# Patient Record
Sex: MALE | Race: WHITE | NOT HISPANIC OR LATINO | Employment: FULL TIME | URBAN - METROPOLITAN AREA
[De-identification: names, ages, dates, MRNs, and addresses within clinical notes are randomized per-mention and may not be internally consistent; named-entity substitution may affect disease eponyms.]

---

## 2018-09-10 ENCOUNTER — HOSPITAL ENCOUNTER (EMERGENCY)
Facility: MEDICAL CENTER | Age: 53
End: 2018-09-10
Attending: EMERGENCY MEDICINE | Admitting: PLASTIC SURGERY

## 2018-09-10 ENCOUNTER — APPOINTMENT (OUTPATIENT)
Dept: RADIOLOGY | Facility: MEDICAL CENTER | Age: 53
End: 2018-09-10
Attending: PLASTIC SURGERY

## 2018-09-10 ENCOUNTER — HOSPITAL ENCOUNTER (OUTPATIENT)
Dept: RADIOLOGY | Facility: MEDICAL CENTER | Age: 53
End: 2018-09-10

## 2018-09-10 VITALS
RESPIRATION RATE: 18 BRPM | HEIGHT: 65 IN | BODY MASS INDEX: 39.41 KG/M2 | WEIGHT: 236.55 LBS | DIASTOLIC BLOOD PRESSURE: 91 MMHG | TEMPERATURE: 98 F | OXYGEN SATURATION: 97 % | SYSTOLIC BLOOD PRESSURE: 142 MMHG | HEART RATE: 75 BPM

## 2018-09-10 DIAGNOSIS — S61.213A LACERATION OF LEFT MIDDLE FINGER WITHOUT FOREIGN BODY, NAIL DAMAGE STATUS UNSPECIFIED, INITIAL ENCOUNTER: ICD-10-CM

## 2018-09-10 DIAGNOSIS — S62.609B OPEN FRACTURE OF PHALANX OF DIGIT OF HAND, INITIAL ENCOUNTER: ICD-10-CM

## 2018-09-10 PROCEDURE — 303747 HCHG EXTRA SUTURE

## 2018-09-10 PROCEDURE — 700111 HCHG RX REV CODE 636 W/ 250 OVERRIDE (IP): Performed by: EMERGENCY MEDICINE

## 2018-09-10 PROCEDURE — 99284 EMERGENCY DEPT VISIT MOD MDM: CPT

## 2018-09-10 PROCEDURE — 304999 HCHG REPAIR-SIMPLE/INTERMED LEVEL 1

## 2018-09-10 RX ORDER — BUPIVACAINE HYDROCHLORIDE 2.5 MG/ML
INJECTION, SOLUTION EPIDURAL; INFILTRATION; INTRACAUDAL
Status: DISCONTINUED
Start: 2018-09-10 | End: 2018-09-11 | Stop reason: HOSPADM

## 2018-09-10 RX ORDER — CEPHALEXIN 500 MG/1
500 CAPSULE ORAL 4 TIMES DAILY
Qty: 28 CAP | Refills: 0 | Status: SHIPPED | OUTPATIENT
Start: 2018-09-10 | End: 2018-09-17

## 2018-09-10 RX ORDER — BUPIVACAINE HYDROCHLORIDE 2.5 MG/ML
10 INJECTION, SOLUTION EPIDURAL; INFILTRATION; INTRACAUDAL ONCE
Status: COMPLETED | OUTPATIENT
Start: 2018-09-10 | End: 2018-09-10

## 2018-09-10 RX ORDER — HYDROCODONE BITARTRATE AND ACETAMINOPHEN 5; 325 MG/1; MG/1
1-2 TABLET ORAL EVERY 6 HOURS PRN
Qty: 20 TAB | Refills: 0 | Status: SHIPPED | OUTPATIENT
Start: 2018-09-10 | End: 2018-09-15

## 2018-09-10 RX ADMIN — BUPIVACAINE HYDROCHLORIDE 10 ML: 2.5 INJECTION, SOLUTION EPIDURAL; INFILTRATION; INTRACAUDAL; PERINEURAL at 19:30

## 2018-09-10 ASSESSMENT — PAIN SCALES - GENERAL: PAINLEVEL_OUTOF10: 5

## 2018-09-11 NOTE — ED NOTES
Per report, pt was using a branch shredder wearing heavy leather gloves and L middle finger was sucked in to shredder around 1200 today

## 2018-09-11 NOTE — OP REPORT
DATE OF SERVICE:  09/10/2018    PREOPERATIVE DIAGNOSIS:  Crush injury involving the distal phalanx and soft   tissue of the left middle finger.    POSTOPERATIVE DIAGNOSIS:  Crush injury involving the distal phalanx and soft   tissue of the left middle finger.    PROCEDURE:  1.  Washout of open fracture of the left middle finger with debridement   including skin, subcutaneous tissue, muscle, and bone.  2.  Open reduction of the comminuted distal phalangeal fracture and internal   fixation with suture.  3.  Repair of the distal finger soft tissue injury with local tissue   rearrangement, 4 cm2.  4.  Repair of nailbed injury.    ATTENDING SURGEON:  Alvin Choi MD    ANESTHESIA:  Digital block.    ESTIMATED BLOOD LOSS:  Minimal.    COMPLICATIONS:  No apparent.    INDICATIONS FOR PROCEDURE:  The patient is a 53-year-old gentleman who   sustained a significant injury to his left middle finger after getting the tip   caught in a wood .  The patient has a significantly comminuted open   fracture involving the distal phalanx.  I do not think there is enough bony   stability in order to place a percutaneous pin through.  He also did have   decreased flexion at the distal interphalangeal joint.  This is likely related   to the insertion of the FDP in the comminuted bone.  The patient now presents   for repair.    DESCRIPTION OF PROCEDURE:  After the informed consent was obtained, the finger   was marked.  Digital blocks were performed.  The finger was then copiously   irrigated.  Following this, a tourniquet was then placed at the base of the   wound.  The wound was then debrided which was done sharply with rongeurs and   Iris scissors and the debridement included skin, subcutaneous tissue, muscle   fascia, and bone.  Once this was then debrided, the finger was inspected.  The   bone was so significantly comminuted that I did not feel that any   percutaneous pin could be utilized.  A 4-0 Vicryl suture was then  used to   repair the soft tissue around the bone internally in order to reapproximate   this.  After this, the bone was then reapproximated with the suture.  There   was significant nailbed injury.  This was repaired with interrupted chromic   sutures and there was soft tissue injury and this was then repaired with local   advancement flap after it was undermined and advanced.  The 3-0 and 4-0 nylon   sutures were then used to repair this.  After doing so, the patient did have   some flexion at the joint.  Xeroform gauze and a compressive dressing were   then placed.  The patient tolerated the procedure well.  At the end of   procedure, all sponge, instrument, and needle counts were correct.       ____________________________________     MD LUPE RG / LUDY    DD:  09/10/2018 22:58:53  DT:  09/11/2018 02:35:42    D#:  [id number]  Job#:  [id number]

## 2018-09-11 NOTE — CONSULTS
DATE OF SERVICE:  09/10/2018    CONSULTING PHYSICIAN:  Cuauhtemoc Brizuela MD    REASON FOR CONSULTATION:  Left middle finger soft tissue injury and fracture.    HISTORY OF PRESENT ILLNESS:  The patient is a 53-year-old right-hand dominant   gentleman who was using a wood  earlier today and got the tip of his   left middle finger caught in the wood .  He also sustained a small soft   tissue injury to the left thumb.  The patient was initially seen at an   outside hospital and then transferred to [facility] for   more definitive care.  The patient denies previous hand surgeries or hand   injuries.  He denies any other major medical problems.    PAST SURGICAL HISTORY:  Denies previous surgical problems.    SOCIAL HISTORY:  Denies tobacco or alcohol use.  He is a nonsmoker.  He is   .    ALLERGIES:  None.    MEDICATIONS:  None.    REVIEW OF SYSTEMS:  Positive for the left middle finger pain, left thumb pain,   but otherwise negative per AMA and CMS guidelines.    PHYSICAL EXAMINATION:  VITAL SIGNS:  At the time of admission, blood pressure 145/98, pulse 115,   temperature of 36.7, respirations of 18.  His BMI is 39.  GENERAL:  He is alert and oriented and appropriate.  HEAD AND NECK:  Exam reveals to be normocephalic and atraumatic.  RESPIRATORY:  He is unlabored.  CARDIAC.  He is tachycardic.  ABDOMEN:  Obese but soft and nontender.  BACK:  Benign.  EXTREMITIES:  Reveals the right upper extremity that is unremarkable.  His   left upper extremity reveals a complete split through the distal phalanx of   the left middle finger involving the nail bed and the entire bony aspect of   the finger that is both dorsal and volar in nature.  The flexor digitorum   profundus is intact, but only moves a portion of the soft tissue due to the   sagittal type of injury.  His hand is otherwise neurovascularly intact to   median, ulnar, and radial distributions.  The flexor and extensor tendons  to   all other fingers and thumb are intact.  Compartments of his hand are intact.    He is able to extend the finger.  NEUROLOGIC:  Unremarkable.  PSYCHIATRIC:  Normal mood and affect.  His studies reveal a fracture involving   the entire distal phalanx of the left middle finger goes to the IP joint.    The bone is significantly comminuted with soft tissue injury.    ASSESSMENT:  Left middle finger crush injury from a wood .    RECOMMENDATIONS:  The patient has sustained a significant soft tissue and bony   injury to the left middle finger. Clinically, he is going to need operative repair. Given the significant bony   injury, there is not enough bone to put a pin or plate on.  I do recommend   approximating the bone and then repairing the surrounding soft tissue that   would act as an internal splint.  The patient did have decreased flexion due   to the insertion of the flexor digitorum profundus being involved with the   comminuted bone.  By bringing the bone together, this should improve.    Long-term, the patient may need revisional surgery including a possible   amputation.  The risks, benefits and alternatives of the procedure were   discussed and appropriate consent was obtained.  Please see separate procedure   note.  The patient will be discharged on Keflex and pain medication.  I have   him follow up in clinic in 2-3 weeks' time.  Signs and symptoms of infection   were reviewed.  All of his questions are answered and discussed this with the   emergency room staff.       ____________________________________     MD LUPE RG / LUDY    DD:  09/10/2018 22:55:25  DT:  09/11/2018 02:47:34    D#:  [id number]  Job#:  [id number]

## 2018-09-11 NOTE — DISCHARGE INSTRUCTIONS
Return for increasing pain, redness, fever, or pus. No drinking or driving on [place].     Please follow up with a primary physician for blood pressure management, diabetic screening, and all other preventive health concerns.     Fingertip Injuries and Amputations  Fingertip injuries are common and often get injured because they are last to escape when pulling your hand out of harm's way. You have amputated (cut off) part of your finger. How this turns out depends largely on how much was amputated. If just the tip is amputated, often the end of the finger will grow back and the finger may return to much the same as it was before the injury.   If more of the finger is missing, your caregiver has done the best with the tissue remaining to allow you to keep as much finger as is possible. Your caregiver after checking your injury has tried to leave you with a painless fingertip that has durable, feeling skin. If possible, your caregiver has tried to maintain the finger's length and appearance and preserve its fingernail.   Please read the instructions outlined below and refer to this sheet in the next few weeks. These instructions provide you with general information on caring for yourself. Your caregiver may also give you specific instructions. While your treatment has been done according to the most current medical practices available, unavoidable complications occasionally occur. If you have any problems or questions after discharge, please call your caregiver.  HOME CARE INSTRUCTIONS   · You may resume normal diet and activities as directed or allowed.  · Keep your hand elevated above the level of your heart. This helps decrease pain and swelling.  · Keep ice packs (or a bag of ice wrapped in a towel) on the injured area for 15-20 minutes, 3-4 times per day, for the first two days.  · Change dressings if necessary or as directed.  · Clean the wound daily or as directed.  · Only take over-the-counter or prescription  medicines for pain, discomfort, or fever as directed by your caregiver.  · Keep appointments as directed.  SEEK IMMEDIATE MEDICAL CARE IF:  · You develop redness, swelling, numbness or increasing pain in the wound.  · There is pus coming from the wound.  · You develop an unexplained oral temperature above 102° F (38.9° C) or as your caregiver suggests.  · There is a foul (bad) smell coming from the wound or dressing.  · There is a breaking open of the wound (edges not staying together) after sutures or staples have been removed.  MAKE SURE YOU:   · Understand these instructions.  · Will watch your condition.  · Will get help right away if you are not doing well or get worse.  Document Released: 11/08/2006 Document Revised: 03/11/2013 Document Reviewed: 10/07/2009  [employer]® Patient Information ©2014 [employer], [employer].

## 2018-09-11 NOTE — ED TRIAGE NOTES
"Sergey Velez    Chief Complaint   Patient presents with   • Digit Pain   • Amputation     Left middle finger        Pt ambulatory to triage with above complaint. Pt drove personal vehicle to [place] from [place].  Partial amputation on L middle finger. Tetanus, PO pain medication given PTA.      Pt returned to lobby, educated on triage process, and to inform staff of any changes or concerns. [facility] called for room.       Blood Pressure: 145/98, Pulse: (!) 115, Respiration: 18, Temperature: 36.7 °C (98 °F), Height: 165.1 cm (5' 5\"), Weight: 107.3 kg (236 lb 8.9 oz), Pulse Oximetry: 96 %, O2 Delivery: None (Room Air)        "

## 2018-09-11 NOTE — ED NOTES
Pt ambs to room, pt transfer from [place].      Xray, digital block, irrigation and dressing completed PTA.  Pt in moderate pain.  Sent for hand specialist.  Chart up for any ERP

## 2018-09-11 NOTE — ED NOTES
Patient's finger wrapped and given supplies. D/C home with written and verbal instructions re: Rx, activity, f/u.  Verbalizes understanding.  Ambulated out with steady gait with family.

## 2018-09-11 NOTE — ED PROVIDER NOTES
"ED Provider Note    Scribed for Cuauhtemoc Brizuela M.D. by Karina Galindo. 9/10/2018, 7:05 PM.    Primary care provider: No primary care provider   Means of arrival: krunal  History obtained from: patient  History limited by: none    CHIEF COMPLAINT  Chief Complaint   Patient presents with   • Digit Pain   • Amputation     Left middle finger        HPI  Sergey Velez is a 53 y.o. male who presents to the Emergency Department for left middle finger injury onset seven hours ago. While using leather gloves, patient used a wood . Tip of glove got caught in machine, pulling in middle finger, making a partial amputation. He was seen at an outside hospital in [place] and directed here for further treatment. Associated finger pain.    REVIEW OF SYSTEMS  Pertinent positives include finger injury, finger pain.   Pertinent negatives include fever.   All other systems negative.    PAST MEDICAL HISTORY  None    SURGICAL HISTORY  patient denies any surgical history    SOCIAL HISTORY  Social History   Substance Use Topics   • Smoking status: Never Smoker   • Smokeless tobacco: Never Used   • Alcohol use No      History   Drug Use No       FAMILY HISTORY  History reviewed. No pertinent family history.    CURRENT MEDICATIONS  None    ALLERGIES  No Known Allergies    PHYSICAL EXAM  VITAL SIGNS: /98   Pulse (!) 115   Temp 36.7 °C (98 °F)   Resp 18   Ht 1.651 m (5' 5\")   Wt 107.3 kg (236 lb 8.9 oz)   SpO2 96%   BMI 39.36 kg/m²     Constitutional: Well developed, Well nourished, moderate distress.   HENT: Normocephalic, Atraumatic, Oropharynx moist.   Eyes: Conjunctiva normal, No discharge.   Neck: Supple, No stridor   Cardiovascular: Normal heart rate, Normal rhythm, No murmurs, equal pulses.   Pulmonary: Normal breath sounds, No respiratory distress, No wheezing, No rales, No rhonchi.  Chest: No chest wall tenderness or deformity.   Abdomen:Soft, No tenderness, No masses, no rebound, no guarding.   Back: No " CVA tenderness.   Musculoskeletal: Abrasion and partial avulsion to left thumb nail and left thumb, laceration along the thumb side of the nail extending down middle finger all the way to the DIP joint, 5cm in total length, pain and tenderness to proximal phalanx, able to extend finger against flexor, unable to flex distal phalanx   Skin: Warm, Dry, Abrasion and partial avulsion to left thumb nail and left thumb, laceration along the thumb side of the nail extending down middle finger all the way to the DIP joint, 5cm in total length  Neurologic: Alert & oriented x 3, Normal motor function,  No focal deficits noted.   Psychiatric: Affect normal, Judgment normal, Mood normal.       DIAGNOSTIC STUDIES/PROCEDURES  RADIOLOGY  OUTSIDE IMAGES-DX UPPER EXTREMITY, LEFT   Final Result      The radiologist's interpretation of all radiological studies have been reviewed by me.    Procedure  Digital Block:  7:16 PM performed by Dr Brizuela  Sterile process was used  Indication: finger pain  Consetnt: verbal from the patient  Location: left middle finger  Anesthesia: 0.25% bupivacaine  Toleration: the patient tolerated well, no immediate complications or bleeding    COURSE & MEDICAL DECISION MAKING  Pertinent Labs & Imaging studies reviewed. (See chart for details)    7:05 PM - Patient seen and examined at bedside. Patient will be treated with 10ml bupivcaine.     7:15 PM Performed digital block procedure. See above for details.    7:38 PM Paged for hand.    7:39 PM Consulted with anayeli Gonzalez, about the patient's case. He will come down to see the patient.    7:44 PM Patient reevaluated at bedside. Patient resting. Discussed conversation with hand and plan of care.    8:22 PM Informed that patient will be going to surgery in an hour.    8:22 PM Patient reevaluated at bedside. Patient resting. Discussed plan of care.    10:01 PM Consulted with anayeli Ayoub, about the patient's case. Advised for more  bupivcaine.    10:03 PM Went to give anesthesia, but patient states that he is now starting to feel numb and will notify staff of any changes.    10:33 PM Dr Choi at bedside for surgery.     11:00 PM Patient reevaluated at bedside. Patient is feeling improved, is resting comfortably, and in no acute distress. Discussed plan for discharge; I advised the patient to follow-up with primary care physician, and to return to the [facility] ED with any new or worsening symptoms, including fever. Patient was given the opportunity for questions. I addressed all questions or concerns at this time and they verbalize agreement to the plan of care.    Reviewed the patient in the [place] Prescription Monitoring Program .    Medical Decision Making: Patient presents with an open fracture and significant defect to the distal phalanx of his left middle finger.  At this point time patient has been digital blocked and hand surgery has repaired the soft tissue.  Patient will follow up with them in 2 weeks.  Hand surgery requested that the patient been placed on prophylactic antibiotics.    I reviewed prescription monitoring program for patient's narcotic use before prescribing a scheduled drug.The patient will not drink alcohol nor drive with prescribed medications. The patient will return for new or worsening symptoms and is stable at the time of discharge.    The patient is referred to a primary physician for blood pressure management, diabetic screening, and for all other preventative health concerns.    In prescribing controlled substances to this patient, I certify that I have obtained and reviewed the medical history of Sergey Velez. I have also made a good helena effort to obtain applicable records from other providers who have treated the patient and records did not demonstrate any increased risk of substance abuse that would prevent me from prescribing controlled substances.     I have conducted a physical exam and documented it.  I have reviewed Mr. Velez’s prescription history as maintained by the [place] Prescription Monitoring Program.     I have assessed the patient’s risk for abuse, dependency, and addiction using the validated Opioid Risk Tool available at https://www.mdcalc.com/hxnctn-bude-wtxo-ort-narcotic-abuse. 0    Given the above, I believe the benefits of controlled substance therapy outweigh the risks. The reasons for prescribing controlled substances include non-narcotic, oral analgesic alternatives have been inadequate for pain control. Accordingly, I have discussed the risk and benefits, treatment plan, and alternative therapies with the patient.     DISPOSITION:  Patient will be discharged home in stable condition.    FOLLOW UP:  No follow-up provider specified.    OUTPATIENT MEDICATIONS:  Discharge Medication List as of 9/10/2018 11:31 PM      START taking these medications    Details   cephALEXin (KEFLEX) 500 MG Cap Take 1 Cap by mouth 4 times a day for 7 days., Disp-28 Cap, R-0, Print Rx Paper      HYDROcodone-acetaminophen (NORCO) 5-325 MG Tab per tablet Take 1-2 Tabs by mouth every 6 hours as needed for up to 5 days., Disp-20 Tab, R-0, Print Rx Paper, For 5 days           FINAL IMPRESSION  1. Laceration of left middle finger without foreign body, nail damage status unspecified, initial encounter    2. Open fracture of phalanx of digit of hand, initial encounter       Karina DELGADO (Cha), am scribing for, and in the presence of, Cuauhtemoc Brizuela M.D.    Electronically signed by: Karina Galindo (Cha), 9/10/2018    ICuauhtemoc M.D. personally performed the services described in this documentation, as scribed by Karina Galindo in my presence, and it is both accurate and complete.    C    The note accurately reflects work and decisions made by me.  Cuauhtemoc Brizuela  9/11/2018  3:03 AM